# Patient Record
Sex: FEMALE | Race: WHITE | Employment: UNEMPLOYED | RURAL
[De-identification: names, ages, dates, MRNs, and addresses within clinical notes are randomized per-mention and may not be internally consistent; named-entity substitution may affect disease eponyms.]

---

## 2022-09-08 ENCOUNTER — HOSPITAL ENCOUNTER (OUTPATIENT)
Facility: HOSPITAL | Age: 87
Discharge: HOME | End: 2022-09-08

## 2022-09-08 LAB
A/G RATIO: 0.9 (ref 0.8–2)
ALBUMIN SERPL-MCNC: 3 G/DL (ref 3.4–4.8)
ALP BLD-CCNC: 133 U/L (ref 25–100)
ALT SERPL-CCNC: 19 U/L (ref 4–36)
ANION GAP SERPL CALCULATED.3IONS-SCNC: 10 MMOL/L (ref 3–16)
AST SERPL-CCNC: 48 U/L (ref 8–33)
BASOPHILS ABSOLUTE: 0.1 K/UL (ref 0–0.1)
BASOPHILS RELATIVE PERCENT: 0.9 %
BILIRUB SERPL-MCNC: 0.4 MG/DL (ref 0.3–1.2)
BUN BLDV-MCNC: 10 MG/DL (ref 6–20)
CALCIUM SERPL-MCNC: 9.3 MG/DL (ref 8.5–10.5)
CHLORIDE BLD-SCNC: 106 MMOL/L (ref 98–107)
CHOLESTEROL, TOTAL: 192 MG/DL (ref 0–200)
CO2: 24 MMOL/L (ref 20–30)
CREAT SERPL-MCNC: 0.7 MG/DL (ref 0.4–1.2)
EOSINOPHILS ABSOLUTE: 0.4 K/UL (ref 0–0.4)
EOSINOPHILS RELATIVE PERCENT: 4.7 %
GFR AFRICAN AMERICAN: >59
GFR NON-AFRICAN AMERICAN: >60
GLOBULIN: 3.5 G/DL
GLUCOSE BLD-MCNC: 85 MG/DL (ref 74–106)
HCT VFR BLD CALC: 37.7 % (ref 37–47)
HDLC SERPL-MCNC: 27 MG/DL (ref 40–60)
HEMOGLOBIN: 11.7 G/DL (ref 11.5–16.5)
IMMATURE GRANULOCYTES #: 0 K/UL
IMMATURE GRANULOCYTES %: 0.5 % (ref 0–5)
LDL CHOLESTEROL CALCULATED: 135 MG/DL
LYMPHOCYTES ABSOLUTE: 2.1 K/UL (ref 1.5–4)
LYMPHOCYTES RELATIVE PERCENT: 26.3 %
MCH RBC QN AUTO: 32.4 PG (ref 27–32)
MCHC RBC AUTO-ENTMCNC: 31 G/DL (ref 31–35)
MCV RBC AUTO: 104.4 FL (ref 80–100)
MONOCYTES ABSOLUTE: 0.5 K/UL (ref 0.2–0.8)
MONOCYTES RELATIVE PERCENT: 6.3 %
NEUTROPHILS ABSOLUTE: 4.8 K/UL (ref 2–7.5)
NEUTROPHILS RELATIVE PERCENT: 61.3 %
PDW BLD-RTO: 18.6 % (ref 11–16)
PLATELET # BLD: 286 K/UL (ref 150–400)
PMV BLD AUTO: 10.1 FL (ref 6–10)
POTASSIUM SERPL-SCNC: 3.8 MMOL/L (ref 3.4–5.1)
RBC # BLD: 3.61 M/UL (ref 3.8–5.8)
SODIUM BLD-SCNC: 140 MMOL/L (ref 136–145)
TOTAL PROTEIN: 6.5 G/DL (ref 6.4–8.3)
TRIGL SERPL-MCNC: 151 MG/DL (ref 0–249)
TSH SERPL DL<=0.05 MIU/L-ACNC: 1.99 UIU/ML (ref 0.27–4.2)
VITAMIN D 25-HYDROXY: 78.2 (ref 32–100)
VLDLC SERPL CALC-MCNC: 30 MG/DL
WBC # BLD: 7.8 K/UL (ref 4–11)

## 2022-09-08 PROCEDURE — 80061 LIPID PANEL: CPT

## 2022-09-08 PROCEDURE — 80053 COMPREHEN METABOLIC PANEL: CPT

## 2022-09-08 PROCEDURE — 36415 COLL VENOUS BLD VENIPUNCTURE: CPT

## 2022-09-08 PROCEDURE — 82306 VITAMIN D 25 HYDROXY: CPT

## 2022-09-08 PROCEDURE — 84443 ASSAY THYROID STIM HORMONE: CPT

## 2022-09-08 PROCEDURE — 85025 COMPLETE CBC W/AUTO DIFF WBC: CPT

## 2022-09-26 ENCOUNTER — HOSPITAL ENCOUNTER (OUTPATIENT)
Facility: HOSPITAL | Age: 87
Discharge: HOME | End: 2022-09-26

## 2022-09-26 LAB
FOLATE: 6.57 NG/ML
HCT VFR BLD CALC: 36.2 % (ref 37–47)
HEMOGLOBIN: 11.4 G/DL (ref 11.5–16.5)
MCH RBC QN AUTO: 33 PG (ref 27–32)
MCHC RBC AUTO-ENTMCNC: 31.5 G/DL (ref 31–35)
MCV RBC AUTO: 104.9 FL (ref 80–100)
PDW BLD-RTO: 15.9 % (ref 11–16)
PLATELET # BLD: 191 K/UL (ref 150–400)
PMV BLD AUTO: 9.9 FL (ref 6–10)
RBC # BLD: 3.45 M/UL (ref 3.8–5.8)
VITAMIN B-12: 360 PG/ML (ref 211–911)
WBC # BLD: 5.3 K/UL (ref 4–11)

## 2022-09-26 PROCEDURE — 82607 VITAMIN B-12: CPT

## 2022-09-26 PROCEDURE — 82746 ASSAY OF FOLIC ACID SERUM: CPT

## 2022-09-26 PROCEDURE — 36415 COLL VENOUS BLD VENIPUNCTURE: CPT

## 2022-09-26 PROCEDURE — 85027 COMPLETE CBC AUTOMATED: CPT

## 2022-09-30 ENCOUNTER — OUTSIDE SERVICES (OUTPATIENT)
Dept: FAMILY MEDICINE CLINIC | Age: 87
End: 2022-09-30
Payer: MEDICARE

## 2022-09-30 DIAGNOSIS — T14.8XXA FRACTURE: ICD-10-CM

## 2022-09-30 DIAGNOSIS — R53.1 WEAKNESS: Primary | ICD-10-CM

## 2022-09-30 PROCEDURE — 99305 1ST NF CARE MODERATE MDM 35: CPT | Performed by: FAMILY MEDICINE

## 2022-09-30 NOTE — PROGRESS NOTES
SUBJECTIVE:    Patient ID: Willi Ferrera is a 80 y.o. female. Chief Complaint   Patient presents with    Extremity Weakness       HPI: nursign home admission      Review of Systems     OBJECTIVE:  There were no vitals taken for this visit. Wt Readings from Last 3 Encounters:   No data found for Wt     BP Readings from Last 3 Encounters:   No data found for BP      Pulse Readings from Last 3 Encounters:   No data found for Pulse     There is no height or weight on file to calculate BMI. Resp Readings from Last 3 Encounters:   No data found for Resp     Past medical, surgical, family and social history were reviewed and updated with the patient.      Physical Exam               Results in Past 30 Days  Result Component Current Result Ref Range Previous Result Ref Range   Albumin/Globulin Ratio 0.9 (9/8/2022) 0.8 - 2.0 Not in Time Range    Albumin 3.0 (L) (9/8/2022) 3.4 - 4.8 g/dL Not in Time Range    Alkaline Phosphatase 133 (H) (9/8/2022) 25 - 100 U/L Not in Time Range    ALT 19 (9/8/2022) 4 - 36 U/L Not in Time Range    AST 48 (H) (9/8/2022) 8 - 33 U/L Not in Time Range    BUN 10 (9/8/2022) 6 - 20 mg/dL Not in Time Range    Calcium 9.3 (9/8/2022) 8.5 - 10.5 mg/dL Not in Time Range    Chloride 106 (9/8/2022) 98 - 107 mmol/L Not in Time Range    CO2 24 (9/8/2022) 20 - 30 mmol/L Not in Time Range    Creatinine 0.7 (9/8/2022) 0.4 - 1.2 mg/dL Not in Time Range    GFR  >59 (9/8/2022) >59 Not in Time Range    GFR Non- >60 (9/8/2022) >59 Not in Time Range    Globulin 3.5 (9/8/2022) Not Established g/dL Not in Time Range    Glucose 85 (9/8/2022) 74 - 106 mg/dL Not in Time Range    Potassium 3.8 (9/8/2022) 3.4 - 5.1 mmol/L Not in Time Range    Sodium 140 (9/8/2022) 136 - 145 mmol/L Not in Time Range    Total Bilirubin 0.4 (9/8/2022) 0.3 - 1.2 mg/dL Not in Time Range    Total Protein 6.5 (9/8/2022) 6.4 - 8.3 g/dL Not in Time Range      LDL Calculated (mg/dL)   Date Value   09/08/2022 135 (H)       Lab Results   Component Value Date/Time    WBC 5.3 09/26/2022 09:40 AM    NEUTROABS 4.8 09/08/2022 01:00 PM    HGB 11.4 09/26/2022 09:40 AM    HCT 36.2 09/26/2022 09:40 AM    .9 09/26/2022 09:40 AM     09/26/2022 09:40 AM     Lab Results   Component Value Date    TSH 1.99 09/08/2022       ASSESSMENT/PLAN    Diagnosis Orders   1. Weakness        2. Fracture            No orders of the defined types were placed in this encounter. There are no discontinued medications. Controlled Substances Monitoring:      Please note: This chart was generated using Dragon dictation software. Although every effort was made to ensure the accuracy of this automated transcription, some errors in transcription may have occurred.

## 2022-11-09 ENCOUNTER — HOSPITAL ENCOUNTER (OUTPATIENT)
Facility: HOSPITAL | Age: 87
Discharge: HOME | End: 2022-11-09

## 2022-11-09 LAB
BASOPHILS ABSOLUTE: 0.1 K/UL (ref 0–0.1)
BASOPHILS RELATIVE PERCENT: 1 %
EOSINOPHILS ABSOLUTE: 0.6 K/UL (ref 0–0.4)
EOSINOPHILS RELATIVE PERCENT: 10.1 %
HCT VFR BLD CALC: 39.6 % (ref 37–47)
HEMOGLOBIN: 12.6 G/DL (ref 11.5–16.5)
IMMATURE GRANULOCYTES #: 0 K/UL
IMMATURE GRANULOCYTES %: 0.2 % (ref 0–5)
LYMPHOCYTES ABSOLUTE: 2.1 K/UL (ref 1.5–4)
LYMPHOCYTES RELATIVE PERCENT: 36.2 %
MCH RBC QN AUTO: 32.7 PG (ref 27–32)
MCHC RBC AUTO-ENTMCNC: 31.8 G/DL (ref 31–35)
MCV RBC AUTO: 102.9 FL (ref 80–100)
MONOCYTES ABSOLUTE: 0.4 K/UL (ref 0.2–0.8)
MONOCYTES RELATIVE PERCENT: 6.6 %
NEUTROPHILS ABSOLUTE: 2.7 K/UL (ref 2–7.5)
NEUTROPHILS RELATIVE PERCENT: 45.9 %
PDW BLD-RTO: 14.1 % (ref 11–16)
PLATELET # BLD: 165 K/UL (ref 150–400)
PMV BLD AUTO: 10.4 FL (ref 6–10)
RBC # BLD: 3.85 M/UL (ref 3.8–5.8)
WBC # BLD: 5.8 K/UL (ref 4–11)

## 2022-11-09 PROCEDURE — 85025 COMPLETE CBC W/AUTO DIFF WBC: CPT

## 2022-11-13 ENCOUNTER — OUTSIDE SERVICES (OUTPATIENT)
Dept: FAMILY MEDICINE CLINIC | Age: 87
End: 2022-11-13

## 2022-11-13 VITALS
HEART RATE: 76 BPM | RESPIRATION RATE: 18 BRPM | DIASTOLIC BLOOD PRESSURE: 81 MMHG | OXYGEN SATURATION: 98 % | SYSTOLIC BLOOD PRESSURE: 128 MMHG | TEMPERATURE: 97.4 F | BODY MASS INDEX: 20.91 KG/M2 | WEIGHT: 118 LBS | HEIGHT: 63 IN

## 2022-11-13 DIAGNOSIS — E87.6 HYPOKALEMIA: ICD-10-CM

## 2022-11-13 DIAGNOSIS — G89.4 CHRONIC PAIN SYNDROME: ICD-10-CM

## 2022-11-13 DIAGNOSIS — E44.1 MILD PROTEIN-CALORIE MALNUTRITION (HCC): ICD-10-CM

## 2022-11-13 DIAGNOSIS — M15.9 OSTEOARTHRITIS INVOLVING MULTIPLE JOINTS ON BOTH SIDES OF BODY: ICD-10-CM

## 2022-11-13 DIAGNOSIS — D50.8 OTHER IRON DEFICIENCY ANEMIA: ICD-10-CM

## 2022-11-13 DIAGNOSIS — G47.33 OSA (OBSTRUCTIVE SLEEP APNEA): ICD-10-CM

## 2022-11-13 DIAGNOSIS — R53.81 MALAISE: ICD-10-CM

## 2022-11-13 DIAGNOSIS — F03.B0 MODERATE DEMENTIA WITHOUT BEHAVIORAL DISTURBANCE, PSYCHOTIC DISTURBANCE, MOOD DISTURBANCE, OR ANXIETY, UNSPECIFIED DEMENTIA TYPE: ICD-10-CM

## 2022-11-13 DIAGNOSIS — F51.05 INSOMNIA DUE TO OTHER MENTAL DISORDER: ICD-10-CM

## 2022-11-13 DIAGNOSIS — F32.A DEPRESSION, UNSPECIFIED DEPRESSION TYPE: ICD-10-CM

## 2022-11-13 DIAGNOSIS — F99 INSOMNIA DUE TO OTHER MENTAL DISORDER: ICD-10-CM

## 2022-11-13 DIAGNOSIS — J30.9 ALLERGIC RHINITIS, UNSPECIFIED SEASONALITY, UNSPECIFIED TRIGGER: ICD-10-CM

## 2022-11-13 DIAGNOSIS — E55.9 VITAMIN D DEFICIENCY: ICD-10-CM

## 2022-11-13 DIAGNOSIS — E03.9 ACQUIRED HYPOTHYROIDISM: Primary | ICD-10-CM

## 2022-11-13 DIAGNOSIS — M62.81 GENERALIZED MUSCLE WEAKNESS: ICD-10-CM

## 2022-11-13 DIAGNOSIS — K27.9 PUD (PEPTIC ULCER DISEASE): ICD-10-CM

## 2022-11-13 DIAGNOSIS — K57.90 DIVERTICULOSIS: ICD-10-CM

## 2022-11-13 PROBLEM — D64.9 ABSOLUTE ANEMIA: Status: ACTIVE | Noted: 2022-11-13

## 2022-11-13 PROBLEM — K92.2 GASTROINTESTINAL HEMORRHAGE: Status: ACTIVE | Noted: 2022-11-13

## 2022-11-13 ASSESSMENT — ENCOUNTER SYMPTOMS: EYES NEGATIVE: 1

## 2022-11-13 NOTE — PROGRESS NOTES
SUBJECTIVE:  Eduar Wu is a 80 y.o. female that presents with   Chief Complaint   Patient presents with    Depression    Hypothyroidism    Anemia    Chronic Pain   . HPI:    This is an 78-year-old female who resides at [address] [place] [address],[address] and rehab center in [facility] today's visit is a follow-up to her nursing home stay. She is asleep it is early and she is asleep when I get there but awakens easily she is confused not oriented but he is now alert. Her past medical history is diverticulosis peptic ulcer disease chronic pain syndrome allergic rhinitis vitamin D deficiency episodes of hypokalemia absolute anemia depression acquired hypothyroidism moderate dementia mild protein calorie malnutrition insomnia obstructive sleep apnea osteoarthritis involving multiple joints on both sides of the body and she has had gastrointestinal hemorrhage in the past with the peptic ulcer disease. Today she cannot really tell me about any of these episodes. She tells me she was . She says she is taking her medicine. She says she is eating. She does also have generalized muscle weakness most likely relevant to her age and nutritional status. Today she says her knee joints are sore but that is not unusual.  I did review her medications as well as her skin assessments per nursing. She denies complaint at this time. Hypothyroidism    HPI:  Currently treated for Hypothyroidism? Yes  Fatigue? Yes  Recent change in weight? No  Cold/Heat intolerance? Yes  Diarrhea/Constipation? Yes  Diaphoresis? No  Anxiety? Yes  Palpitations? No   Hair Loss? No            Review of Systems   Constitutional:  Positive for chills and fatigue. HENT: Negative. Eyes: Negative. Respiratory:  Positive for shortness of breath. With activity   Cardiovascular: Negative. Gastrointestinal:  Positive for constipation. Endocrine: Negative. Genitourinary: Negative.     Musculoskeletal:  Positive for arthralgias, back pain, gait problem and myalgias. Skin: Negative. Neurological:  Positive for weakness. Hematological:  Bruises/bleeds easily. Psychiatric/Behavioral:  Positive for confusion, decreased concentration, dysphoric mood and sleep disturbance. The patient is nervous/anxious. All other systems reviewed and are negative. OBJECTIVE:  /81   Pulse 76   Temp 97.4 °F (36.3 °C)   Resp 18   Ht 5' 3\" (1.6 m)   Wt 118 lb (53.5 kg)   SpO2 98%   BMI 20.90 kg/m²   Physical Exam  Vitals and nursing note reviewed. Constitutional:       Appearance: Normal appearance. She is well-developed and underweight. HENT:      Head: Normocephalic and atraumatic. Right Ear: External ear normal.      Left Ear: External ear normal.      Nose: Nose normal.      Mouth/Throat:      Mouth: Mucous membranes are moist.      Pharynx: Oropharynx is clear. Eyes:      Conjunctiva/sclera: Conjunctivae normal.      Pupils: Pupils are equal, round, and reactive to light. Cardiovascular:      Rate and Rhythm: Normal rate and regular rhythm. Pulses: Normal pulses. Heart sounds: Normal heart sounds. Pulmonary:      Effort: Pulmonary effort is normal.      Breath sounds: Normal breath sounds. Abdominal:      General: Abdomen is flat. Bowel sounds are normal.      Palpations: Abdomen is soft. Musculoskeletal:         General: Normal range of motion. Cervical back: Normal range of motion and neck supple. Skin:     General: Skin is warm and dry. Neurological:      Mental Status: She is alert and easily aroused. Mental status is at baseline. Deep Tendon Reflexes: Reflexes are normal and symmetric. Psychiatric:         Attention and Perception: Attention and perception normal.         Mood and Affect: Affect normal. Mood is anxious and depressed. Speech: Speech normal.         Behavior: Behavior is cooperative. Thought Content:  Thought content normal. Cognition and Memory: Cognition is impaired. Memory is impaired. Judgment: Judgment normal.     Results in Past 30 Days  Result Component Current Result Ref Range Previous Result Ref Range   Albumin/Globulin Ratio 1.0 (12/12/2022) 0.8 - 2.0 Not in Time Range    Albumin 3.0 (L) (12/12/2022) 3.4 - 4.8 g/dL Not in Time Range    Alkaline Phosphatase 109 (H) (12/12/2022) 25 - 100 U/L Not in Time Range    ALT 16 (12/12/2022) 4 - 36 U/L Not in Time Range    AST 32 (12/12/2022) 8 - 33 U/L Not in Time Range    BUN 15 (12/12/2022) 6 - 20 mg/dL Not in Time Range    Calcium 9.7 (12/12/2022) 8.5 - 10.5 mg/dL Not in Time Range    Chloride 108 (H) (12/12/2022) 98 - 107 mmol/L Not in Time Range    CO2 23 (12/12/2022) 20 - 30 mmol/L Not in Time Range    Creatinine 0.9 (12/12/2022) 0.4 - 1.2 mg/dL Not in Time Range    Est, Glom Filt Rate >60 (12/12/2022) >59 Not in Time Range    Globulin 3.0 (12/12/2022) Not Established g/dL Not in Time Range    Glucose 135 (H) (12/12/2022) 74 - 106 mg/dL Not in Time Range    Potassium 3.8 (12/12/2022) 3.4 - 5.1 mmol/L Not in Time Range    Sodium 142 (12/12/2022) 136 - 145 mmol/L Not in Time Range    Total Bilirubin 0.6 (12/12/2022) 0.3 - 1.2 mg/dL Not in Time Range    Total Protein 6.0 (L) (12/12/2022) 6.4 - 8.3 g/dL Not in Time Range        LDL Calculated (mg/dL)   Date Value   09/08/2022 135 (H)         Lab Results   Component Value Date/Time    WBC 6.5 12/12/2022 09:40 AM    NEUTROABS 3.3 12/12/2022 09:40 AM    HGB 12.7 12/12/2022 09:40 AM    HCT 39.4 12/12/2022 09:40 AM    .8 12/12/2022 09:40 AM     12/12/2022 09:40 AM       Lab Results   Component Value Date    TSH 1.74 12/12/2022         ASSESSMENT/PLAN:   Diagnosis Orders   1. Acquired hypothyroidism        2. JACKIE (obstructive sleep apnea)        3. Diverticulosis        4. PUD (peptic ulcer disease)        5.  Moderate dementia without behavioral disturbance, psychotic disturbance, mood disturbance, or anxiety, unspecified dementia type        6. Chronic pain syndrome        7. Osteoarthritis involving multiple joints on both sides of body        8. Malaise        9. Allergic rhinitis, unspecified seasonality, unspecified trigger        10. Vitamin D deficiency        11. Hypokalemia        12. Other iron deficiency anemia        13. Depression, unspecified depression type        14. Mild protein-calorie malnutrition ([facility] [address]        15. Insomnia due to other mental disorder        16. Generalized muscle weakness              No orders of the defined types were placed in this encounter. No outpatient encounter medications on file as of 11/13/2022. No facility-administered encounter medications on file as of 11/13/2022. Return in about 3 months (around 2/13/2023), or if symptoms worsen or fail to improve. PATIENT COUNSELING    Counseling was provided today regardingthe following topics: Healthy eating habits, Regular exercise, substance abuse and healthy sleep habits. Discussed use, benefit, and side effectsof prescribed medications. Barriers to medication compliance addressed. All patient questions answered. Pt voiced understanding.

## 2022-12-12 ENCOUNTER — HOSPITAL ENCOUNTER (OUTPATIENT)
Facility: HOSPITAL | Age: 87
Discharge: HOME | End: 2022-12-12

## 2022-12-12 LAB
A/G RATIO: 1 (ref 0.8–2)
ALBUMIN SERPL-MCNC: 3 G/DL (ref 3.4–4.8)
ALP BLD-CCNC: 109 U/L (ref 25–100)
ALT SERPL-CCNC: 16 U/L (ref 4–36)
ANION GAP SERPL CALCULATED.3IONS-SCNC: 11 MMOL/L (ref 3–16)
AST SERPL-CCNC: 32 U/L (ref 8–33)
BASOPHILS ABSOLUTE: 0.1 K/UL (ref 0–0.1)
BASOPHILS RELATIVE PERCENT: 1.2 %
BILIRUB SERPL-MCNC: 0.6 MG/DL (ref 0.3–1.2)
BUN BLDV-MCNC: 15 MG/DL (ref 6–20)
CALCIUM SERPL-MCNC: 9.7 MG/DL (ref 8.5–10.5)
CHLORIDE BLD-SCNC: 108 MMOL/L (ref 98–107)
CO2: 23 MMOL/L (ref 20–30)
CREAT SERPL-MCNC: 0.9 MG/DL (ref 0.4–1.2)
EOSINOPHILS ABSOLUTE: 0.6 K/UL (ref 0–0.4)
EOSINOPHILS RELATIVE PERCENT: 9.4 %
GFR SERPL CREATININE-BSD FRML MDRD: >60 ML/MIN/{1.73_M2}
GLOBULIN: 3 G/DL
GLUCOSE BLD-MCNC: 135 MG/DL (ref 74–106)
HCT VFR BLD CALC: 39.4 % (ref 37–47)
HEMOGLOBIN: 12.7 G/DL (ref 11.5–16.5)
IMMATURE GRANULOCYTES #: 0 K/UL
IMMATURE GRANULOCYTES %: 0.2 % (ref 0–5)
LYMPHOCYTES ABSOLUTE: 2.2 K/UL (ref 1.5–4)
LYMPHOCYTES RELATIVE PERCENT: 33.3 %
MCH RBC QN AUTO: 32.8 PG (ref 27–32)
MCHC RBC AUTO-ENTMCNC: 32.2 G/DL (ref 31–35)
MCV RBC AUTO: 101.8 FL (ref 80–100)
MONOCYTES ABSOLUTE: 0.4 K/UL (ref 0.2–0.8)
MONOCYTES RELATIVE PERCENT: 5.5 %
NEUTROPHILS ABSOLUTE: 3.3 K/UL (ref 2–7.5)
NEUTROPHILS RELATIVE PERCENT: 50.4 %
PDW BLD-RTO: 14.6 % (ref 11–16)
PLATELET # BLD: 174 K/UL (ref 150–400)
PMV BLD AUTO: 10.4 FL (ref 6–10)
POTASSIUM SERPL-SCNC: 3.8 MMOL/L (ref 3.4–5.1)
RBC # BLD: 3.87 M/UL (ref 3.8–5.8)
SODIUM BLD-SCNC: 142 MMOL/L (ref 136–145)
TOTAL PROTEIN: 6 G/DL (ref 6.4–8.3)
TSH SERPL DL<=0.05 MIU/L-ACNC: 1.74 UIU/ML (ref 0.27–4.2)
VITAMIN D 25-HYDROXY: 47.8 (ref 32–100)
WBC # BLD: 6.5 K/UL (ref 4–11)

## 2022-12-12 PROCEDURE — 84443 ASSAY THYROID STIM HORMONE: CPT

## 2022-12-12 PROCEDURE — 85025 COMPLETE CBC W/AUTO DIFF WBC: CPT

## 2022-12-12 PROCEDURE — 82306 VITAMIN D 25 HYDROXY: CPT

## 2022-12-12 PROCEDURE — 36415 COLL VENOUS BLD VENIPUNCTURE: CPT

## 2022-12-12 PROCEDURE — 80053 COMPREHEN METABOLIC PANEL: CPT

## 2022-12-18 ASSESSMENT — ENCOUNTER SYMPTOMS
CONSTIPATION: 1
SHORTNESS OF BREATH: 1
BACK PAIN: 1

## 2023-02-17 PROCEDURE — 99308 SBSQ NF CARE LOW MDM 20: CPT | Performed by: FAMILY MEDICINE

## 2023-02-28 ENCOUNTER — OUTSIDE SERVICES (OUTPATIENT)
Dept: FAMILY MEDICINE CLINIC | Age: 88
End: 2023-02-28
Payer: MEDICARE

## 2023-02-28 DIAGNOSIS — F32.A DEPRESSION, UNSPECIFIED DEPRESSION TYPE: ICD-10-CM

## 2023-02-28 DIAGNOSIS — M15.9 OSTEOARTHRITIS INVOLVING MULTIPLE JOINTS ON BOTH SIDES OF BODY: ICD-10-CM

## 2023-02-28 DIAGNOSIS — E44.1 MILD PROTEIN-CALORIE MALNUTRITION (HCC): ICD-10-CM

## 2023-02-28 DIAGNOSIS — R53.1 WEAKNESS: Primary | ICD-10-CM

## 2023-02-28 NOTE — PROGRESS NOTES
SUBJECTIVE:    Patient ID: Alexey Mendoza is a 80 y.o. female. Chief Complaint   Patient presents with    Extremity Weakness    Arthritis       HPI: nursing home visit    Review of Systems     OBJECTIVE:  There were no vitals taken for this visit. Wt Readings from Last 3 Encounters:   11/13/22 118 lb (53.5 kg)     BP Readings from Last 3 Encounters:   11/13/22 128/81      Pulse Readings from Last 3 Encounters:   11/13/22 76     There is no height or weight on file to calculate BMI. Resp Readings from Last 3 Encounters:   11/13/22 18     Past medical, surgical, family and social history were reviewed and updated with the patient. Physical Exam               No results found for requested labs within last 30 days. LDL Calculated (mg/dL)   Date Value   09/08/2022 135 (H)       Lab Results   Component Value Date/Time    WBC 6.5 12/12/2022 09:40 AM    NEUTROABS 3.3 12/12/2022 09:40 AM    HGB 12.7 12/12/2022 09:40 AM    HCT 39.4 12/12/2022 09:40 AM    .8 12/12/2022 09:40 AM     12/12/2022 09:40 AM     Lab Results   Component Value Date    TSH 1.74 12/12/2022       ASSESSMENT/PLAN    Diagnosis Orders   1. Weakness        2. Osteoarthritis involving multiple joints on both sides of body        3. Mild protein-calorie malnutrition ([address]        4. Depression, unspecified depression type            See nursing home note above       There are no discontinued medications. Controlled Substances Monitoring:      Please note: This chart was generated using Dragon dictation software. Although every effort was made to ensure the accuracy of this automated transcription, some errors in transcription may have occurred.

## 2023-04-11 ENCOUNTER — OUTSIDE SERVICES (OUTPATIENT)
Dept: FAMILY MEDICINE CLINIC | Age: 88
End: 2023-04-11
Payer: MEDICARE

## 2023-04-11 VITALS
WEIGHT: 111.5 LBS | HEART RATE: 71 BPM | HEIGHT: 63 IN | BODY MASS INDEX: 19.76 KG/M2 | OXYGEN SATURATION: 97 % | DIASTOLIC BLOOD PRESSURE: 72 MMHG | SYSTOLIC BLOOD PRESSURE: 127 MMHG | TEMPERATURE: 98 F | RESPIRATION RATE: 18 BRPM

## 2023-04-11 DIAGNOSIS — G89.4 CHRONIC PAIN SYNDROME: ICD-10-CM

## 2023-04-11 DIAGNOSIS — F03.B0 MODERATE DEMENTIA WITHOUT BEHAVIORAL DISTURBANCE, PSYCHOTIC DISTURBANCE, MOOD DISTURBANCE, OR ANXIETY, UNSPECIFIED DEMENTIA TYPE (HCC): ICD-10-CM

## 2023-04-11 DIAGNOSIS — E44.1 MILD PROTEIN-CALORIE MALNUTRITION (HCC): ICD-10-CM

## 2023-04-11 DIAGNOSIS — G47.33 OSA (OBSTRUCTIVE SLEEP APNEA): ICD-10-CM

## 2023-04-11 DIAGNOSIS — M15.9 OSTEOARTHRITIS INVOLVING MULTIPLE JOINTS ON BOTH SIDES OF BODY: ICD-10-CM

## 2023-04-11 DIAGNOSIS — M62.81 GENERALIZED MUSCLE WEAKNESS: ICD-10-CM

## 2023-04-11 DIAGNOSIS — E03.9 ACQUIRED HYPOTHYROIDISM: Primary | ICD-10-CM

## 2023-04-11 PROCEDURE — 99308 SBSQ NF CARE LOW MDM 20: CPT | Performed by: NURSE PRACTITIONER

## 2023-04-11 ASSESSMENT — ENCOUNTER SYMPTOMS
BACK PAIN: 1
EYES NEGATIVE: 1
SHORTNESS OF BREATH: 1
CONSTIPATION: 1

## 2023-06-23 ENCOUNTER — OUTSIDE SERVICES (OUTPATIENT)
Dept: FAMILY MEDICINE CLINIC | Age: 88
End: 2023-06-23

## 2023-06-23 DIAGNOSIS — R53.1 WEAKNESS: Primary | ICD-10-CM

## 2023-06-23 DIAGNOSIS — F03.B0 MODERATE DEMENTIA WITHOUT BEHAVIORAL DISTURBANCE, PSYCHOTIC DISTURBANCE, MOOD DISTURBANCE, OR ANXIETY, UNSPECIFIED DEMENTIA TYPE (HCC): ICD-10-CM

## 2023-06-23 DIAGNOSIS — M15.9 OSTEOARTHRITIS INVOLVING MULTIPLE JOINTS ON BOTH SIDES OF BODY: ICD-10-CM

## 2023-06-23 DIAGNOSIS — F32.A DEPRESSION, UNSPECIFIED DEPRESSION TYPE: ICD-10-CM

## 2023-06-23 ASSESSMENT — ENCOUNTER SYMPTOMS
EYES NEGATIVE: 1
CONSTIPATION: 1
BACK PAIN: 1
SHORTNESS OF BREATH: 1

## 2023-06-23 NOTE — PROGRESS NOTES
SUBJECTIVE:    Patient ID: Citlalli Shaw is a 80 y.o. female. Chief Complaint   Patient presents with    Extremity Weakness       HPI: nursing home visit  Regulatory visit with her today. She is doing relatively well. She says she is still weak but she is feeling like she is eating pretty good. She has not had any chest pain. She denies any shortness of breath. She still struggles with a lot of arthritic pain. She does feel like her medicine helps some. She seems to be having some slow dementia progression. Her depression seems stable. She has not had any recent falls or injuries. Staff does not have any recent concerns. Review of Systems   Constitutional:  Positive for chills and fatigue. HENT: Negative. Eyes: Negative. Respiratory:  Positive for shortness of breath. With activity   Cardiovascular: Negative. Gastrointestinal:  Positive for constipation. Endocrine: Negative. Genitourinary: Negative. Musculoskeletal:  Positive for arthralgias, back pain, gait problem and myalgias. Skin: Negative. Neurological:  Positive for weakness. Hematological:  Bruises/bleeds easily. Psychiatric/Behavioral:  Positive for confusion, decreased concentration, dysphoric mood and sleep disturbance. The patient is nervous/anxious. All other systems reviewed and are negative. OBJECTIVE:  There were no vitals taken for this visit. Wt Readings from Last 3 Encounters:   04/11/23 111 lb 8 oz (50.6 kg)   11/13/22 118 lb (53.5 kg)     BP Readings from Last 3 Encounters:   04/11/23 127/72   11/13/22 128/81      Pulse Readings from Last 3 Encounters:   04/11/23 71   11/13/22 76     There is no height or weight on file to calculate BMI. Resp Readings from Last 3 Encounters:   04/11/23 18   11/13/22 18     Past medical, surgical, family and social history were reviewed and updated with the patient. Physical Exam  Vitals and nursing note reviewed.    Constitutional:

## 2023-08-12 ENCOUNTER — OUTSIDE SERVICES (OUTPATIENT)
Dept: FAMILY MEDICINE CLINIC | Age: 88
End: 2023-08-12

## 2023-08-12 VITALS
RESPIRATION RATE: 16 BRPM | HEART RATE: 78 BPM | WEIGHT: 113 LBS | SYSTOLIC BLOOD PRESSURE: 104 MMHG | DIASTOLIC BLOOD PRESSURE: 68 MMHG | TEMPERATURE: 97 F | BODY MASS INDEX: 20.02 KG/M2 | OXYGEN SATURATION: 95 % | HEIGHT: 63 IN

## 2023-08-12 DIAGNOSIS — G89.4 CHRONIC PAIN SYNDROME: ICD-10-CM

## 2023-08-12 DIAGNOSIS — F03.B0 MODERATE DEMENTIA WITHOUT BEHAVIORAL DISTURBANCE, PSYCHOTIC DISTURBANCE, MOOD DISTURBANCE, OR ANXIETY, UNSPECIFIED DEMENTIA TYPE (HCC): ICD-10-CM

## 2023-08-12 DIAGNOSIS — M62.81 GENERALIZED MUSCLE WEAKNESS: ICD-10-CM

## 2023-08-12 DIAGNOSIS — M15.9 OSTEOARTHRITIS INVOLVING MULTIPLE JOINTS ON BOTH SIDES OF BODY: ICD-10-CM

## 2023-08-12 DIAGNOSIS — G47.33 OSA (OBSTRUCTIVE SLEEP APNEA): ICD-10-CM

## 2023-08-12 DIAGNOSIS — K57.90 DIVERTICULOSIS: ICD-10-CM

## 2023-08-12 DIAGNOSIS — F32.A DEPRESSION, UNSPECIFIED DEPRESSION TYPE: ICD-10-CM

## 2023-08-12 DIAGNOSIS — E44.1 MILD PROTEIN-CALORIE MALNUTRITION (HCC): ICD-10-CM

## 2023-08-12 DIAGNOSIS — E03.9 ACQUIRED HYPOTHYROIDISM: Primary | ICD-10-CM

## 2023-08-12 ASSESSMENT — ENCOUNTER SYMPTOMS
SHORTNESS OF BREATH: 1
EYES NEGATIVE: 1
CONSTIPATION: 1
BACK PAIN: 1

## 2023-08-12 NOTE — PROGRESS NOTES
SUBJECTIVE:  Zelalem Lagunas is a 80 y.o. female that presents with   Chief Complaint   Patient presents with    Depression    Thyroid Problem    Dementia   . HPI:    This is an 27-year-old female who resides at [facility] and rehab center in [facility]. Today's visit is a [address] mandated regulatory nursing home follow-up. Her past medical history includes peptic ulcer disease without hemorrhage or perforation diverticulosis vitamin D deficiency hypokalemia iron deficiency anemia chronic pain major depressive disorder hypothyroidism she has had hemorrhage because she has had post hemorrhage anemia in the past dementia without behavioral disturbances and protein calorie malnutrition insomnia obstructive sleep apnea osteo arthritis she does have dysphagia as well and because of that she is on thin liquids regular consistency thin liquids and her food is fortified. Her current medications are acetaminophen been as needed ammonium lactate external lotion as needed glycerin rectal suppository as needed ferrous gluconate 324 mg daily Prozac 40 mg Remeron 15 mg pantoprazole 40 mg potassium chloride ER 20 mEq Synthroid 100 mcg vitamin B complex vitamin D and then vitamins A&E external ointment. She is followed by psych as well. She is in her room she is awake she has eaten she is alert she does not know who I am does not Dr. Ben Finley which is interesting since her name is Zach and I thought that might help her to remember. Respirations are even and nonlabored color is good she is a small woman she appears stated age. She denies complaints today      Hypothyroidism    HPI:  Currently treated for Hypothyroidism? Yes  Fatigue? Yes  Recent change in weight? No  Cold/Heat intolerance? Yes  Diarrhea/Constipation? Yes  Diaphoresis? No  Anxiety? Yes  Palpitations? No   Hair Loss? No            Review of Systems   Constitutional:  Positive for chills and fatigue. HENT: Negative.      Eyes:

## 2023-10-09 ENCOUNTER — OUTSIDE SERVICES (OUTPATIENT)
Dept: FAMILY MEDICINE CLINIC | Age: 88
End: 2023-10-09
Payer: MEDICARE

## 2023-10-09 VITALS
DIASTOLIC BLOOD PRESSURE: 56 MMHG | OXYGEN SATURATION: 95 % | SYSTOLIC BLOOD PRESSURE: 102 MMHG | RESPIRATION RATE: 18 BRPM | TEMPERATURE: 97.8 F | BODY MASS INDEX: 19.67 KG/M2 | HEIGHT: 63 IN | HEART RATE: 68 BPM | WEIGHT: 111 LBS

## 2023-10-09 DIAGNOSIS — E03.9 ACQUIRED HYPOTHYROIDISM: Primary | ICD-10-CM

## 2023-10-09 DIAGNOSIS — F32.A DEPRESSION, UNSPECIFIED DEPRESSION TYPE: ICD-10-CM

## 2023-10-09 DIAGNOSIS — M62.81 GENERALIZED MUSCLE WEAKNESS: ICD-10-CM

## 2023-10-09 DIAGNOSIS — G89.4 CHRONIC PAIN SYNDROME: ICD-10-CM

## 2023-10-09 DIAGNOSIS — M15.9 OSTEOARTHRITIS INVOLVING MULTIPLE JOINTS ON BOTH SIDES OF BODY: ICD-10-CM

## 2023-10-09 DIAGNOSIS — G47.33 OSA (OBSTRUCTIVE SLEEP APNEA): ICD-10-CM

## 2023-10-09 DIAGNOSIS — F03.B0 MODERATE DEMENTIA WITHOUT BEHAVIORAL DISTURBANCE, PSYCHOTIC DISTURBANCE, MOOD DISTURBANCE, OR ANXIETY, UNSPECIFIED DEMENTIA TYPE (HCC): ICD-10-CM

## 2023-10-09 PROCEDURE — 99308 SBSQ NF CARE LOW MDM 20: CPT | Performed by: NURSE PRACTITIONER

## 2023-10-09 ASSESSMENT — ENCOUNTER SYMPTOMS
CONSTIPATION: 1
BACK PAIN: 1
SHORTNESS OF BREATH: 1
EYES NEGATIVE: 1

## 2023-10-09 NOTE — PROGRESS NOTES
following topics: Healthy eating habits, Regular exercise, substance abuse and healthy sleep habits. Discussed use, benefit, and side effectsof prescribed medications. Barriers to medication compliance addressed. All patient questions answered. Pt voiced understanding.

## 2023-12-08 PROCEDURE — 99307 SBSQ NF CARE SF MDM 10: CPT | Performed by: FAMILY MEDICINE

## 2023-12-29 ENCOUNTER — OUTSIDE SERVICES (OUTPATIENT)
Dept: FAMILY MEDICINE CLINIC | Age: 88
End: 2023-12-29
Payer: MEDICARE

## 2023-12-29 DIAGNOSIS — M62.81 GENERALIZED MUSCLE WEAKNESS: ICD-10-CM

## 2023-12-29 DIAGNOSIS — F03.B0 MODERATE DEMENTIA WITHOUT BEHAVIORAL DISTURBANCE, PSYCHOTIC DISTURBANCE, MOOD DISTURBANCE, OR ANXIETY, UNSPECIFIED DEMENTIA TYPE (HCC): Primary | ICD-10-CM

## 2023-12-29 DIAGNOSIS — M15.9 OSTEOARTHRITIS INVOLVING MULTIPLE JOINTS ON BOTH SIDES OF BODY: ICD-10-CM

## 2023-12-29 DIAGNOSIS — F32.A DEPRESSION, UNSPECIFIED DEPRESSION TYPE: ICD-10-CM

## 2023-12-29 DIAGNOSIS — G47.33 OSA (OBSTRUCTIVE SLEEP APNEA): ICD-10-CM

## 2023-12-29 NOTE — PROGRESS NOTES
SUBJECTIVE:    Patient ID: Pia Sy is a 80 y.o. female. Chief Complaint   Patient presents with    Follow-up       HPI: nursing home visit  Regulatory visit with her today. She does seem to be about the same. She is not complaining of any significant pain. She says she is cold. She wondered if it was cold outside. She has not had any chest pain. She denies any increase in shortness of breath. She is not having any medication problems that she is able to tell. She nor the staff have any new complaints    Review of Systems   Constitutional:  Positive for chills and fatigue. HENT: Negative. Eyes: Negative. Respiratory:  Positive for shortness of breath. With activity   Cardiovascular: Negative. Gastrointestinal:  Positive for constipation. Endocrine: Negative. Genitourinary: Negative. Musculoskeletal:  Positive for arthralgias, back pain, gait problem and myalgias. Skin: Negative. Neurological:  Positive for weakness. Hematological:  Bruises/bleeds easily. Psychiatric/Behavioral:  Positive for confusion, decreased concentration, dysphoric mood and sleep disturbance. The patient is nervous/anxious. All other systems reviewed and are negative. OBJECTIVE:  There were no vitals taken for this visit. Wt Readings from Last 3 Encounters:   10/09/23 50.3 kg (111 lb)   08/12/23 51.3 kg (113 lb)   04/11/23 50.6 kg (111 lb 8 oz)     BP Readings from Last 3 Encounters:   10/09/23 (!) 102/56   08/12/23 104/68   04/11/23 127/72      Pulse Readings from Last 3 Encounters:   10/09/23 68   08/12/23 78   04/11/23 71     There is no height or weight on file to calculate BMI. Resp Readings from Last 3 Encounters:   10/09/23 18   08/12/23 16   04/11/23 18     Past medical, surgical, family and social history were reviewed and updated with the patient. Physical Exam  Vitals and nursing note reviewed. Constitutional:       Appearance: Normal appearance.  She is

## 2024-02-04 ENCOUNTER — OUTSIDE SERVICES (OUTPATIENT)
Dept: FAMILY MEDICINE CLINIC | Age: 89
End: 2024-02-04

## 2024-02-04 VITALS
TEMPERATURE: 97.7 F | HEIGHT: 63 IN | BODY MASS INDEX: 18.82 KG/M2 | RESPIRATION RATE: 18 BRPM | HEART RATE: 86 BPM | SYSTOLIC BLOOD PRESSURE: 102 MMHG | DIASTOLIC BLOOD PRESSURE: 60 MMHG | OXYGEN SATURATION: 98 % | WEIGHT: 106.2 LBS

## 2024-02-04 DIAGNOSIS — E55.9 VITAMIN D DEFICIENCY: ICD-10-CM

## 2024-02-04 DIAGNOSIS — E03.9 ACQUIRED HYPOTHYROIDISM: Primary | ICD-10-CM

## 2024-02-04 DIAGNOSIS — G47.33 OSA (OBSTRUCTIVE SLEEP APNEA): ICD-10-CM

## 2024-02-04 DIAGNOSIS — F03.B0 MODERATE DEMENTIA WITHOUT BEHAVIORAL DISTURBANCE, PSYCHOTIC DISTURBANCE, MOOD DISTURBANCE, OR ANXIETY, UNSPECIFIED DEMENTIA TYPE (HCC): ICD-10-CM

## 2024-02-04 DIAGNOSIS — M62.81 GENERALIZED MUSCLE WEAKNESS: ICD-10-CM

## 2024-02-04 DIAGNOSIS — F32.A DEPRESSION, UNSPECIFIED DEPRESSION TYPE: ICD-10-CM

## 2024-02-04 DIAGNOSIS — M15.9 OSTEOARTHRITIS INVOLVING MULTIPLE JOINTS ON BOTH SIDES OF BODY: ICD-10-CM

## 2024-02-04 NOTE — PROGRESS NOTES
SUBJECTIVE:  Nidhi Serrano is a 89 y.o. female that presents with   Chief Complaint   Patient presents with    Thyroid Problem    Dementia    Aphasia    Depression    Anxiety   .    HPI:    This is an 89-year-old female who resides at [facility] and rehab [place] in [facility]. [facility].  Her past medical history includes peptic ulcer disease without hemorrhage or perforation diverticulosis vitamin D deficiency hypokalemia iron deficiency anemia chronic pain major depressive disorder hypothyroidism and she has a history of post hemorrhage anemia in the past dementia without behavioral disturbances and protein calorie malnutrition insomnia obstructive sleep apnea osteo arthritis she does have dysphagia as well and because of that she is on thin liquids regular consistency thin liquids and her food is fortified.  Her current medications are acetaminophen been as needed ammonium lactate external lotion as needed glycerin rectal suppository as needed ferrous gluconate 324 mg daily Prozac 40 mg Remeron 15 mg pantoprazole 40 mg potassium chloride ER 20 mEq Synthroid 100 mcg vitamin B complex vitamin D and then vitamins A&E external ointment.  She is also followed by psych.  She is alert she is not oriented but that she is pleasant seems good with talking to me she tells me that she does not remember me but she knows the other doctor's name is Zach and that is her name.  She has no new complaints.    Hypothyroidism    HPI:  Currently treated for Hypothyroidism?  Yes  Fatigue?  Yes  Recent change in weight?  No  Cold/Heat intolerance?  Yes  Diarrhea/Constipation?  Yes  Diaphoresis?  No  Anxiety?  Yes  Palpitations?  No   Hair Loss?  No              Review of Systems   Constitutional:  Positive for chills and fatigue.   HENT: Negative.     Eyes: Negative.    Respiratory:  Positive for shortness of breath.         With activity

## 2024-04-30 ENCOUNTER — OUTSIDE SERVICES (OUTPATIENT)
Dept: FAMILY MEDICINE CLINIC | Age: 89
End: 2024-04-30
Payer: MEDICARE

## 2024-04-30 VITALS
OXYGEN SATURATION: 97 % | SYSTOLIC BLOOD PRESSURE: 120 MMHG | WEIGHT: 107.2 LBS | BODY MASS INDEX: 19 KG/M2 | DIASTOLIC BLOOD PRESSURE: 60 MMHG | HEIGHT: 63 IN | RESPIRATION RATE: 18 BRPM | TEMPERATURE: 97.6 F | HEART RATE: 64 BPM

## 2024-04-30 DIAGNOSIS — M62.81 GENERALIZED MUSCLE WEAKNESS: ICD-10-CM

## 2024-04-30 DIAGNOSIS — F99 INSOMNIA DUE TO OTHER MENTAL DISORDER: ICD-10-CM

## 2024-04-30 DIAGNOSIS — F32.A DEPRESSION, UNSPECIFIED DEPRESSION TYPE: ICD-10-CM

## 2024-04-30 DIAGNOSIS — G89.4 CHRONIC PAIN SYNDROME: ICD-10-CM

## 2024-04-30 DIAGNOSIS — G47.33 OSA (OBSTRUCTIVE SLEEP APNEA): ICD-10-CM

## 2024-04-30 DIAGNOSIS — E03.9 ACQUIRED HYPOTHYROIDISM: Primary | ICD-10-CM

## 2024-04-30 DIAGNOSIS — M15.9 OSTEOARTHRITIS INVOLVING MULTIPLE JOINTS ON BOTH SIDES OF BODY: ICD-10-CM

## 2024-04-30 DIAGNOSIS — F03.B0 MODERATE DEMENTIA WITHOUT BEHAVIORAL DISTURBANCE, PSYCHOTIC DISTURBANCE, MOOD DISTURBANCE, OR ANXIETY, UNSPECIFIED DEMENTIA TYPE (HCC): ICD-10-CM

## 2024-04-30 DIAGNOSIS — E44.1 MILD PROTEIN-CALORIE MALNUTRITION (HCC): ICD-10-CM

## 2024-04-30 DIAGNOSIS — D50.8 OTHER IRON DEFICIENCY ANEMIA: ICD-10-CM

## 2024-04-30 DIAGNOSIS — E55.9 VITAMIN D DEFICIENCY: ICD-10-CM

## 2024-04-30 DIAGNOSIS — F51.05 INSOMNIA DUE TO OTHER MENTAL DISORDER: ICD-10-CM

## 2024-04-30 PROCEDURE — 99308 SBSQ NF CARE LOW MDM 20: CPT | Performed by: NURSE PRACTITIONER

## 2024-05-01 NOTE — PROGRESS NOTES
Negative.    Gastrointestinal:  Positive for constipation.   Endocrine: Negative.    Genitourinary: Negative.    Musculoskeletal:  Positive for arthralgias, back pain, gait problem and myalgias.   Skin: Negative.    Neurological:  Positive for weakness.   Hematological:  Bruises/bleeds easily.   Psychiatric/Behavioral:  Positive for confusion, decreased concentration, dysphoric mood and sleep disturbance. The patient is nervous/anxious.    All other systems reviewed and are negative.       OBJECTIVE:  /60   Pulse 64   Temp 97.6 °F (36.4 °C)   Resp 18   Ht 1.6 m (5' 3\")   Wt 48.6 kg (107 lb 3.2 oz)   SpO2 97%   BMI 18.99 kg/m²   Physical Exam  Vitals and nursing note reviewed.   Constitutional:       Appearance: Normal appearance. She is well-developed and normal weight.   HENT:      Head: Normocephalic and atraumatic.      Right Ear: External ear normal.      Left Ear: External ear normal.      Nose: Nose normal.      Mouth/Throat:      Mouth: Mucous membranes are moist.      Pharynx: Oropharynx is clear.   Eyes:      Conjunctiva/sclera: Conjunctivae normal.      Pupils: Pupils are equal, round, and reactive to light.   Cardiovascular:      Rate and Rhythm: Normal rate and regular rhythm.      Pulses: Normal pulses.      Heart sounds: Normal heart sounds.   Pulmonary:      Effort: Pulmonary effort is normal.      Breath sounds: Normal breath sounds.   Abdominal:      General: Abdomen is flat. Bowel sounds are normal.      Palpations: Abdomen is soft.   Musculoskeletal:         General: Normal range of motion.      Cervical back: Normal range of motion and neck supple.   Skin:     General: Skin is warm and dry.   Neurological:      Mental Status: She is alert and easily aroused. Mental status is at baseline.      Deep Tendon Reflexes: Reflexes are normal and symmetric.   Psychiatric:         Attention and Perception: Attention and perception normal.         Mood and Affect: Affect normal. Mood is anxious

## 2024-05-06 ASSESSMENT — ENCOUNTER SYMPTOMS
CONSTIPATION: 1
BACK PAIN: 1
EYES NEGATIVE: 1
SHORTNESS OF BREATH: 1

## 2024-06-28 ENCOUNTER — OUTSIDE SERVICES (OUTPATIENT)
Dept: FAMILY MEDICINE CLINIC | Age: 89
End: 2024-06-28

## 2024-06-28 DIAGNOSIS — F32.A DEPRESSION, UNSPECIFIED DEPRESSION TYPE: ICD-10-CM

## 2024-06-28 DIAGNOSIS — M15.9 OSTEOARTHRITIS INVOLVING MULTIPLE JOINTS ON BOTH SIDES OF BODY: ICD-10-CM

## 2024-06-28 DIAGNOSIS — E44.1 MILD PROTEIN-CALORIE MALNUTRITION (HCC): ICD-10-CM

## 2024-06-28 DIAGNOSIS — R53.1 WEAKNESS: ICD-10-CM

## 2024-06-28 DIAGNOSIS — F03.B0 MODERATE DEMENTIA WITHOUT BEHAVIORAL DISTURBANCE, PSYCHOTIC DISTURBANCE, MOOD DISTURBANCE, OR ANXIETY, UNSPECIFIED DEMENTIA TYPE (HCC): Primary | ICD-10-CM

## 2024-06-28 ASSESSMENT — ENCOUNTER SYMPTOMS
BACK PAIN: 1
EYES NEGATIVE: 1

## 2024-06-28 NOTE — PROGRESS NOTES
SUBJECTIVE:    Patient ID: Nidhi Serrano is a 89 y.o. female.    Chief Complaint   Patient presents with    Dementia              HPI: nursing home visit  Regulatory visit with her today about her dementia.  She is still struggling with quite a bit of weakness.  She is in the bed most all the time.  She is not really feeling like eating very much.  She says that she is not hungry.  She is not having any chest pain or shortness of breath per her or staff report.  She is not having any medication problems that we can tell.  She seems to be slowly progressing in her dementia.    Review of Systems   HENT: Negative.     Eyes: Negative.    Respiratory:          With activity   Cardiovascular: Negative.    Endocrine: Negative.    Genitourinary: Negative.    Musculoskeletal:  Positive for arthralgias, back pain, gait problem and myalgias.   Skin: Negative.    Neurological:  Positive for weakness.   Hematological:  Bruises/bleeds easily.   Psychiatric/Behavioral:  Positive for confusion, decreased concentration, dysphoric mood and sleep disturbance. The patient is nervous/anxious.    All other systems reviewed and are negative.       OBJECTIVE:  There were no vitals taken for this visit.   Wt Readings from Last 3 Encounters:   04/30/24 48.6 kg (107 lb 3.2 oz)   02/04/24 48.2 kg (106 lb 3.2 oz)   10/09/23 50.3 kg (111 lb)     BP Readings from Last 3 Encounters:   04/30/24 120/60   02/04/24 102/60   10/09/23 (!) 102/56      Pulse Readings from Last 3 Encounters:   04/30/24 64   02/04/24 86   10/09/23 68     There is no height or weight on file to calculate BMI.   Resp Readings from Last 3 Encounters:   04/30/24 18   02/04/24 18   10/09/23 18     Past medical, surgical, family and social history were reviewed and updated with the patient.     Physical Exam  Vitals and nursing note reviewed.   Constitutional:       Appearance: Normal appearance. She is well-developed and underweight.   HENT:      Head: Normocephalic and